# Patient Record
Sex: FEMALE | Race: WHITE | URBAN - METROPOLITAN AREA
[De-identification: names, ages, dates, MRNs, and addresses within clinical notes are randomized per-mention and may not be internally consistent; named-entity substitution may affect disease eponyms.]

---

## 2024-08-19 ENCOUNTER — OFFICE VISIT (OUTPATIENT)
Dept: DENTISTRY | Facility: CLINIC | Age: 24
End: 2024-08-19

## 2024-08-19 VITALS — HEART RATE: 103 BPM | TEMPERATURE: 99.1 F | DIASTOLIC BLOOD PRESSURE: 94 MMHG | SYSTOLIC BLOOD PRESSURE: 138 MMHG

## 2024-08-19 DIAGNOSIS — K08.9 EXTRACTION OF TOOTH NEEDED: Primary | ICD-10-CM

## 2024-08-19 DIAGNOSIS — Z01.20 ENCOUNTER FOR DENTAL EXAMINATION: ICD-10-CM

## 2024-08-19 PROCEDURE — D0140 LIMITED ORAL EVALUATION - PROBLEM FOCUSED: HCPCS

## 2024-08-19 PROCEDURE — D0220 INTRAORAL - PERIAPICAL FIRST RADIOGRAPHIC IMAGE: HCPCS

## 2024-08-19 RX ORDER — IBUPROFEN 600 MG/1
600 TABLET, FILM COATED ORAL EVERY 6 HOURS PRN
Qty: 28 TABLET | Refills: 0 | Status: SHIPPED | OUTPATIENT
Start: 2024-08-19 | End: 2024-08-26

## 2024-08-19 RX ORDER — ACETAMINOPHEN 500 MG
500 TABLET ORAL EVERY 6 HOURS PRN
Qty: 28 TABLET | Refills: 0 | Status: SHIPPED | OUTPATIENT
Start: 2024-08-19 | End: 2024-08-26

## 2024-08-19 NOTE — PROGRESS NOTES
Dental procedures in this visit     - LIMITED ORAL EVALUATION - PROBLEM FOCUSED (Completed)     Service provider: Chris Barboza DMD     Billing provider: Chris Barboza DMD     - INTRAORAL - PERIAPICAL FIRST RADIOGRAPHIC IMAGE 14 (Completed)     Service provider: Chris Barboza DMD     Billing provider: Chris Barboza DMD     Subjective   Patient ID: Soheila Mcdonald is a 23 y.o. female.  No chief complaint on file.    HPI  The following portions of the chart were reviewed this encounter and updated as appropriate:    Meds  Problems  Med Hx  Surg Hx  Fam Hx           Objective   Soft Tissue Exam  No findings documented this visit      Dental Exam    Radiographic Interpretation:   Associated radiographs for today's visit were reviewed and finding(s) were discussed with the patient.   Findings include: PA tooth #14 grossly carious, no PARL evident  Hard Tissue Exam:  Decay noted - see charting and treatment plan  Reference tooth chart for additional findings.    Assessment & Plan   Problem List Items Addressed This Visit    None  Visit Diagnoses       Extraction of tooth needed    -  Primary    Relevant Medications    acetaminophen (TYLENOL) 500 mg tablet    ibuprofen (MOTRIN) 600 mg tablet    Other Relevant Orders    14 INTRAORAL - PERIAPICAL FIRST RADIOGRAPHIC IMAGE (Completed)    LIMITED ORAL EVALUATION - PROBLEM FOCUSED (Completed)    Ambulatory referral to Oral Maxillofacial Surgery    Encounter for dental examination        Relevant Medications    acetaminophen (TYLENOL) 500 mg tablet    ibuprofen (MOTRIN) 600 mg tablet    Other Relevant Orders    Ambulatory referral to Oral Maxillofacial Surgery        24 yo female presents with grossly carious #14, causing pain. Upon clinical and radiographic exam, extraction warranted. An [facility] referral was provided, as well as x-ray and provider sheet. An Rx for ibuprofen / acetaminophen was sent to the patients  pharmacy for pain. Patient understood and agreed to the plan.    NV: [facility] referral    Comp / PAN / 4 BW if interested

## 2024-08-22 ENCOUNTER — TELEPHONE (OUTPATIENT)
Dept: DENTISTRY | Facility: CLINIC | Age: 24
End: 2024-08-22